# Patient Record
Sex: FEMALE | Race: WHITE | NOT HISPANIC OR LATINO
[De-identification: names, ages, dates, MRNs, and addresses within clinical notes are randomized per-mention and may not be internally consistent; named-entity substitution may affect disease eponyms.]

---

## 2017-11-12 ENCOUNTER — TRANSCRIPTION ENCOUNTER (OUTPATIENT)
Age: 62
End: 2017-11-12

## 2018-11-07 ENCOUNTER — RESULT REVIEW (OUTPATIENT)
Age: 63
End: 2018-11-07

## 2019-08-02 ENCOUNTER — TRANSCRIPTION ENCOUNTER (OUTPATIENT)
Age: 64
End: 2019-08-02

## 2019-08-02 ENCOUNTER — APPOINTMENT (OUTPATIENT)
Dept: ORTHOPEDIC SURGERY | Facility: CLINIC | Age: 64
End: 2019-08-02
Payer: MEDICARE

## 2019-08-02 VITALS
HEIGHT: 62 IN | SYSTOLIC BLOOD PRESSURE: 113 MMHG | TEMPERATURE: 98.5 F | WEIGHT: 130 LBS | DIASTOLIC BLOOD PRESSURE: 74 MMHG | BODY MASS INDEX: 23.92 KG/M2 | HEART RATE: 74 BPM

## 2019-08-02 DIAGNOSIS — M54.5 LOW BACK PAIN: ICD-10-CM

## 2019-08-02 DIAGNOSIS — M47.816 SPONDYLOSIS W/OUT MYELOPATHY OR RADICULOPATHY, LUMBAR REGION: ICD-10-CM

## 2019-08-02 DIAGNOSIS — Z87.39 PERSONAL HISTORY OF OTHER DISEASES OF THE MUSCULOSKELETAL SYSTEM AND CONNECTIVE TISSUE: ICD-10-CM

## 2019-08-02 DIAGNOSIS — M60.9 MYOSITIS, UNSPECIFIED: ICD-10-CM

## 2019-08-02 DIAGNOSIS — Z80.9 FAMILY HISTORY OF MALIGNANT NEOPLASM, UNSPECIFIED: ICD-10-CM

## 2019-08-02 DIAGNOSIS — Z86.39 PERSONAL HISTORY OF OTHER ENDOCRINE, NUTRITIONAL AND METABOLIC DISEASE: ICD-10-CM

## 2019-08-02 PROCEDURE — 99204 OFFICE O/P NEW MOD 45 MIN: CPT

## 2019-08-02 PROCEDURE — 72100 X-RAY EXAM L-S SPINE 2/3 VWS: CPT

## 2019-08-02 RX ORDER — LEVOTHYROXINE SODIUM 137 UG/1
TABLET ORAL
Refills: 0 | Status: ACTIVE | COMMUNITY

## 2019-08-02 RX ORDER — VENLAFAXINE HCL 50 MG
TABLET ORAL
Refills: 0 | Status: ACTIVE | COMMUNITY
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
## 2019-08-02 NOTE — ADDENDUM
[FreeTextEntry1] : Documented by Tyler Morrell acting as a scribe for Dr. Francis Torres on 08/02/2019.\par \par All medical record entries made by the Scribe were at my, Dr. Francis Torres, direction and personally dictated by me on 08/02/2019. I have reviewed the chart and agree that the record accurately reflects my personal performance of the history, physical exam, assessment and plan. I have also personally directed, reviewed, and agreed with the chart.

## 2019-08-02 NOTE — DISCUSSION/SUMMARY
[de-identified] : I have recommended that the pt continue with a conservative treatment plan. Home exercises were recommended, focusing on core strengthening. The pt was advised to continue with use of anti-inflammatories prn. The pt has been referred to Physiatry for possible injection therapy. She may FU PRN. If her symptoms continue after 6-8 weeks, we will consider referral to pain management.

## 2019-08-02 NOTE — PHYSICAL EXAM
[de-identified] : CONSTITUTIONAL: The patient is a very pleasant individual who is well-nourished and who appears stated age.\par PSYCHIATRIC: The patient is alert and oriented X 3 and in no apparent distress, and participates with orthopedic evaluation well.\par HEAD: Atraumatic and is nonsyndromic in appearance.\par EENT: No visible thyromegaly, EOMI.\par RESPIRATORY: Respiratory rate is regular, not dyspneic on examination.\par LYMPHATICS: There is no inguinal lymphadenopathy\par INTEGUMENTARY: Skin is clean, dry, and intact about the bilateral lower extremities and lumbar spine.\par VASCULAR: There is brisk capillary refill about the bilateral lower extremities.\par NEUROLOGIC: There are no pathologic reflexes. There is no decrease in sensation of the bilateral lower extremities on Wartenberg pinwheel examination. Deep tendon reflexes are well maintained at 2+/4 of the bilateral lower extremities and are symmetric. \par MUSCULOSKELETAL: There is no visible muscular atrophy. Manual motor strength is well maintained in the bilateral lower extremities. Range of motion of lumbar spine is well maintained. The patient ambulates in a non-myelopathic manner. Negative tension sign and straight leg raise bilaterally. Quad extension, ankle dorsiflexion, EHL, plantar flexion, and ankle eversion are well preserved. Normal secondary orthopaedic exam of bilateral hips, greater trochanteric area, knees and ankles.  [de-identified] : Xray of the Lumbar spine taken today: AP projection shows pedicles are well visualized L1-L5, no rotoscoliosis, lateral projections show well maintained lumbar lordosis, lumbar spondylosis L3 through S1, appears age appropriate, with no acute processes noted.

## 2019-11-19 ENCOUNTER — RESULT REVIEW (OUTPATIENT)
Age: 64
End: 2019-11-19

## 2020-11-20 ENCOUNTER — RESULT REVIEW (OUTPATIENT)
Age: 65
End: 2020-11-20

## 2021-02-02 ENCOUNTER — APPOINTMENT (OUTPATIENT)
Dept: OTOLARYNGOLOGY | Facility: CLINIC | Age: 66
End: 2021-02-02
Payer: MEDICARE

## 2021-02-09 ENCOUNTER — APPOINTMENT (OUTPATIENT)
Dept: OTOLARYNGOLOGY | Facility: CLINIC | Age: 66
End: 2021-02-09
Payer: MEDICARE

## 2021-02-09 VITALS — TEMPERATURE: 96 F | SYSTOLIC BLOOD PRESSURE: 119 MMHG | HEART RATE: 90 BPM | DIASTOLIC BLOOD PRESSURE: 67 MMHG

## 2021-02-09 DIAGNOSIS — L43.9 LICHEN PLANUS, UNSPECIFIED: ICD-10-CM

## 2021-02-09 DIAGNOSIS — Z87.891 PERSONAL HISTORY OF NICOTINE DEPENDENCE: ICD-10-CM

## 2021-02-09 PROCEDURE — 99213 OFFICE O/P EST LOW 20 MIN: CPT

## 2021-02-09 RX ORDER — PRAVASTATIN SODIUM 80 MG/1
TABLET ORAL
Refills: 0 | Status: DISCONTINUED | COMMUNITY
End: 2021-02-09

## 2021-02-09 RX ORDER — EZETIMIBE 10 MG/1
TABLET ORAL
Refills: 0 | Status: DISCONTINUED | COMMUNITY
End: 2021-02-09

## 2021-02-09 RX ORDER — ALIROCUMAB 150 MG/ML
INJECTION, SOLUTION SUBCUTANEOUS
Refills: 0 | Status: ACTIVE | COMMUNITY

## 2021-02-09 NOTE — CONSULT LETTER
[Dear  ___] : Dear  [unfilled], [Consult Letter:] : I had the pleasure of evaluating your patient, [unfilled]. [Please see my note below.] : Please see my note below. [Sincerely,] : Sincerely, [FreeTextEntry2] : Michael Winters MD ( [place], [place]) [FreeTextEntry3] : Ash Cintron MD, FACS\par \par    [facility]\par Associate Chair\par    Department of Otolaryngology\par \par Professor\par Otolaryngology & Molecular Medicine\par [employer]\par

## 2021-02-09 NOTE — HISTORY OF PRESENT ILLNESS
[de-identified] : Mrs. Marquez is a 64 yo female referred by Dr. Winters (otolaryngologist) for evaluation on palpable left neck mass near the mandible. Pt states lump comes and goes for last 3 months. Pt states pressure when swollen, denies pain, no other symptoms. \par CT scan on 11/2020 and MRI  of STN on 1/22/2021 reported no noted mass. \par \par

## 2021-02-09 NOTE — PHYSICAL EXAM
[Midline] : trachea located in midline position [de-identified] : bilat post buccal lichen planus [Normal] : no rashes

## 2021-02-17 ENCOUNTER — RESULT REVIEW (OUTPATIENT)
Age: 66
End: 2021-02-17

## 2021-02-17 ENCOUNTER — APPOINTMENT (OUTPATIENT)
Dept: ULTRASOUND IMAGING | Facility: CLINIC | Age: 66
End: 2021-02-17
Payer: MEDICARE

## 2021-02-17 ENCOUNTER — OUTPATIENT (OUTPATIENT)
Dept: OUTPATIENT SERVICES | Facility: HOSPITAL | Age: 66
LOS: 1 days | End: 2021-02-17
Payer: MEDICARE

## 2021-02-17 DIAGNOSIS — K11.20 SIALOADENITIS, UNSPECIFIED: ICD-10-CM

## 2021-02-17 PROCEDURE — 10005 FNA BX W/US GDN 1ST LES: CPT

## 2021-02-17 PROCEDURE — 88173 CYTOPATH EVAL FNA REPORT: CPT

## 2021-02-17 PROCEDURE — 88305 TISSUE EXAM BY PATHOLOGIST: CPT

## 2021-02-17 PROCEDURE — 88173 CYTOPATH EVAL FNA REPORT: CPT | Mod: 26

## 2021-02-17 PROCEDURE — 88305 TISSUE EXAM BY PATHOLOGIST: CPT | Mod: 26

## 2021-02-24 ENCOUNTER — NON-APPOINTMENT (OUTPATIENT)
Age: 66
End: 2021-02-24

## 2021-06-24 ENCOUNTER — TRANSCRIPTION ENCOUNTER (OUTPATIENT)
Age: 66
End: 2021-06-24

## 2021-07-01 ENCOUNTER — TRANSCRIPTION ENCOUNTER (OUTPATIENT)
Age: 66
End: 2021-07-01

## 2021-08-10 ENCOUNTER — APPOINTMENT (OUTPATIENT)
Dept: OTOLARYNGOLOGY | Facility: CLINIC | Age: 66
End: 2021-08-10
Payer: MEDICARE

## 2021-08-10 VITALS
BODY MASS INDEX: 25.11 KG/M2 | WEIGHT: 133 LBS | SYSTOLIC BLOOD PRESSURE: 123 MMHG | OXYGEN SATURATION: 99 % | DIASTOLIC BLOOD PRESSURE: 85 MMHG | HEIGHT: 61 IN | HEART RATE: 81 BPM

## 2021-08-10 PROCEDURE — 99213 OFFICE O/P EST LOW 20 MIN: CPT

## 2021-08-10 NOTE — HISTORY OF PRESENT ILLNESS
[de-identified] : 66 year old female follow up Feb 2021 FNA left parotid tail, non neoplastic. Pt massages area with fluid draining into mouth. Pt states pressure in area, denies pain.

## 2021-08-10 NOTE — PHYSICAL EXAM
[FreeTextEntry1] : sl fullness L TOP region without change [Midline] : trachea located in midline position [Normal] : no rashes

## 2022-02-28 ENCOUNTER — NON-APPOINTMENT (OUTPATIENT)
Age: 67
End: 2022-02-28

## 2022-03-08 ENCOUNTER — APPOINTMENT (OUTPATIENT)
Dept: ORTHOPEDIC SURGERY | Facility: CLINIC | Age: 67
End: 2022-03-08
Payer: MEDICARE

## 2022-03-08 ENCOUNTER — APPOINTMENT (OUTPATIENT)
Dept: OTOLARYNGOLOGY | Facility: CLINIC | Age: 67
End: 2022-03-08

## 2022-03-08 VITALS — HEIGHT: 61 IN | WEIGHT: 133 LBS | BODY MASS INDEX: 25.11 KG/M2

## 2022-03-08 DIAGNOSIS — M43.16 SPONDYLOLISTHESIS, LUMBAR REGION: ICD-10-CM

## 2022-03-08 DIAGNOSIS — M51.36 OTHER INTERVERTEBRAL DISC DEGENERATION, LUMBAR REGION: ICD-10-CM

## 2022-03-08 PROCEDURE — 72100 X-RAY EXAM L-S SPINE 2/3 VWS: CPT

## 2022-03-08 PROCEDURE — 99214 OFFICE O/P EST MOD 30 MIN: CPT

## 2022-03-08 PROCEDURE — 99204 OFFICE O/P NEW MOD 45 MIN: CPT

## 2022-04-26 ENCOUNTER — TRANSCRIPTION ENCOUNTER (OUTPATIENT)
Age: 67
End: 2022-04-26

## 2022-07-18 ENCOUNTER — NON-APPOINTMENT (OUTPATIENT)
Age: 67
End: 2022-07-18

## 2022-07-27 ENCOUNTER — APPOINTMENT (OUTPATIENT)
Dept: NEUROSURGERY | Facility: CLINIC | Age: 67
End: 2022-07-27

## 2022-07-27 VITALS
DIASTOLIC BLOOD PRESSURE: 60 MMHG | HEIGHT: 61 IN | OXYGEN SATURATION: 95 % | BODY MASS INDEX: 24.92 KG/M2 | SYSTOLIC BLOOD PRESSURE: 106 MMHG | TEMPERATURE: 98.7 F | WEIGHT: 132 LBS | HEART RATE: 76 BPM

## 2022-07-27 DIAGNOSIS — I67.1 CEREBRAL ANEURYSM, NONRUPTURED: ICD-10-CM

## 2022-07-27 PROCEDURE — 99205 OFFICE O/P NEW HI 60 MIN: CPT

## 2022-08-01 NOTE — ASSESSMENT
[FreeTextEntry1] : 2022\par \par Kim Fitzpatrick MD\par [address], [address]\par [place] [id number]\par \par Re:	Tisha Marquez\par :	1955\par Dear Dr. Fitzpatrick:\par \par I saw Mrs. Tisha Marquez in neurosurgical consultation today. She is a 67 year old, right handed female who three weeks ago had a questionable TIA. She was at a  and was confused, and somewhat disoriented, then this cleared. She was seen by yourself. MRI of the head was unremarkable. MRA of the neck was unremarkable. MRA of the head shows a 3 mm, distal cavernous ICA aneurysm. I reviewed the images and there is indeed a small aneurysm there, and it appears to be proximal to the subarachnoid compartment. \par \par The patient’s past medical history is negative. She has high cholesterol and low thyroid. She has had a variety of surgeries, including hysterectomy, benign breast cyst, , neck fusion, foot and wrist surgery. She has no allergies. She does not smoke. Family history is negative. She is on Crestor and Synthroid. She has a normal neurologic exam.\par \par IMPRESSION: This small lesion is an incidental finding, and should be managed conservatively. I would suggest follow-up in six to twelve months with an MRA at the same facility.\par \par I tried to put the patient, and her daughter who accompanied her, at ease. I see no indication for any intervention or angiography, and I will see her in follow-up.\par \par I thank you for the confidence and courtesy of this referral.\par \par Sincerely,\par \par \par \par Sami Cook M.D., F.A.C.S.\par [facility]\par \par \par \par
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
## 2022-08-09 ENCOUNTER — APPOINTMENT (OUTPATIENT)
Dept: OTOLARYNGOLOGY | Facility: CLINIC | Age: 67
End: 2022-08-09

## 2022-08-09 VITALS
DIASTOLIC BLOOD PRESSURE: 71 MMHG | BODY MASS INDEX: 24.92 KG/M2 | HEART RATE: 86 BPM | OXYGEN SATURATION: 98 % | HEIGHT: 61 IN | SYSTOLIC BLOOD PRESSURE: 103 MMHG | WEIGHT: 132 LBS

## 2022-08-09 DIAGNOSIS — K11.20 SIALOADENITIS, UNSPECIFIED: ICD-10-CM

## 2022-08-09 PROCEDURE — 99213 OFFICE O/P EST LOW 20 MIN: CPT

## 2022-08-09 NOTE — HISTORY OF PRESENT ILLNESS
[None] : No associated symptoms are reported. [de-identified] : Ms. Marquez presents for follow up on left neck lump. She reports lump comes intermittently. When she notices it, she rubs it and then she can taste "slimy". Denies pain, dysphagia, dysphonia and or dyspnea. No otalgia or trismus\par 6/30/2022 Normal MRA of the neck\par 2/21/2022 FNA left parotid gland non neoplastic\par 11/17/2020 Ct  scan not showing any masses\par Does not smoke. 1 glass of wine daily

## 2023-06-09 ENCOUNTER — APPOINTMENT (OUTPATIENT)
Dept: ORTHOPEDIC SURGERY | Facility: CLINIC | Age: 68
End: 2023-06-09
Payer: MEDICARE

## 2023-06-09 VITALS — HEIGHT: 62 IN | WEIGHT: 137 LBS | BODY MASS INDEX: 25.21 KG/M2

## 2023-06-09 DIAGNOSIS — M65.312 TRIGGER THUMB, LEFT THUMB: ICD-10-CM

## 2023-06-09 PROCEDURE — 20550 NJX 1 TENDON SHEATH/LIGAMENT: CPT | Mod: LT

## 2023-06-09 PROCEDURE — 99203 OFFICE O/P NEW LOW 30 MIN: CPT | Mod: 25

## 2023-06-09 NOTE — DISCUSSION/SUMMARY
[Medication Risks Reviewed] : Medication risks reviewed [de-identified] : Discussed the nature of the diagnosis and risk and benefits of different modalities of treatment.\par Discussed conservative management vs CSI. \par Pt would like a CSI.\par Done today and tolerated well.\par All questions answered.\par

## 2023-06-09 NOTE — PHYSICAL EXAM
[Left] : left hand [] : no erythema [FreeTextEntry3] : LEFT thumb A1 pulley tenderness with triggering

## 2023-06-09 NOTE — HISTORY OF PRESENT ILLNESS
[5] : 5 [Sharp] : sharp [Retired] : Work status: retired [de-identified] : 67 with one week LEFT thumb pain and stiffness.  Pain and stiffness is worse at night.  There is triggering and pain.  Denies trauma.  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 No known injury/fall. Reports clicking during the day and sharp pain at night for about one week

## 2024-05-22 ENCOUNTER — RESULT REVIEW (OUTPATIENT)
Age: 69
End: 2024-05-22

## 2024-05-22 ENCOUNTER — INPATIENT (INPATIENT)
Facility: HOSPITAL | Age: 69
LOS: 0 days | Discharge: HOME | DRG: 93 | End: 2024-05-23
Attending: INTERNAL MEDICINE | Admitting: INTERNAL MEDICINE
Payer: MEDICARE

## 2024-05-22 VITALS
HEART RATE: 94 BPM | OXYGEN SATURATION: 99 % | DIASTOLIC BLOOD PRESSURE: 71 MMHG | SYSTOLIC BLOOD PRESSURE: 119 MMHG | TEMPERATURE: 98 F | RESPIRATION RATE: 18 BRPM | WEIGHT: 126.99 LBS

## 2024-05-22 DIAGNOSIS — R29.810 FACIAL WEAKNESS: ICD-10-CM

## 2024-05-22 DIAGNOSIS — G45.9 TRANSIENT CEREBRAL ISCHEMIC ATTACK, UNSPECIFIED: ICD-10-CM

## 2024-05-22 DIAGNOSIS — E78.5 HYPERLIPIDEMIA, UNSPECIFIED: ICD-10-CM

## 2024-05-22 DIAGNOSIS — Z86.69 PERSONAL HISTORY OF OTHER DISEASES OF THE NERVOUS SYSTEM AND SENSE ORGANS: ICD-10-CM

## 2024-05-22 DIAGNOSIS — E03.9 HYPOTHYROIDISM, UNSPECIFIED: ICD-10-CM

## 2024-05-22 DIAGNOSIS — G47.33 OBSTRUCTIVE SLEEP APNEA (ADULT) (PEDIATRIC): ICD-10-CM

## 2024-05-22 DIAGNOSIS — F41.9 ANXIETY DISORDER, UNSPECIFIED: ICD-10-CM

## 2024-05-22 DIAGNOSIS — Z29.9 ENCOUNTER FOR PROPHYLACTIC MEASURES, UNSPECIFIED: ICD-10-CM

## 2024-05-22 LAB
ALBUMIN SERPL ELPH-MCNC: 3.9 G/DL — SIGNIFICANT CHANGE UP (ref 3.3–5)
ALP SERPL-CCNC: 81 U/L — SIGNIFICANT CHANGE UP (ref 40–120)
ALT FLD-CCNC: 32 U/L — SIGNIFICANT CHANGE UP (ref 12–78)
ANION GAP SERPL CALC-SCNC: 2 MMOL/L — LOW (ref 5–17)
APTT BLD: 31.7 SEC — SIGNIFICANT CHANGE UP (ref 24.5–35.6)
AST SERPL-CCNC: 24 U/L — SIGNIFICANT CHANGE UP (ref 15–37)
BASOPHILS # BLD AUTO: 0.04 K/UL — SIGNIFICANT CHANGE UP (ref 0–0.2)
BASOPHILS NFR BLD AUTO: 0.5 % — SIGNIFICANT CHANGE UP (ref 0–2)
BILIRUB SERPL-MCNC: 0.3 MG/DL — SIGNIFICANT CHANGE UP (ref 0.2–1.2)
BUN SERPL-MCNC: 19 MG/DL — SIGNIFICANT CHANGE UP (ref 7–23)
CALCIUM SERPL-MCNC: 9.8 MG/DL — SIGNIFICANT CHANGE UP (ref 8.5–10.1)
CHLORIDE SERPL-SCNC: 107 MMOL/L — SIGNIFICANT CHANGE UP (ref 96–108)
CO2 SERPL-SCNC: 30 MMOL/L — SIGNIFICANT CHANGE UP (ref 22–31)
CREAT SERPL-MCNC: 0.65 MG/DL — SIGNIFICANT CHANGE UP (ref 0.5–1.3)
EGFR: 96 ML/MIN/1.73M2 — SIGNIFICANT CHANGE UP
EOSINOPHIL # BLD AUTO: 0.12 K/UL — SIGNIFICANT CHANGE UP (ref 0–0.5)
EOSINOPHIL NFR BLD AUTO: 1.6 % — SIGNIFICANT CHANGE UP (ref 0–6)
GLUCOSE SERPL-MCNC: 99 MG/DL — SIGNIFICANT CHANGE UP (ref 70–99)
HCT VFR BLD CALC: 40.4 % — SIGNIFICANT CHANGE UP (ref 34.5–45)
HGB BLD-MCNC: 13.5 G/DL — SIGNIFICANT CHANGE UP (ref 11.5–15.5)
IMM GRANULOCYTES NFR BLD AUTO: 0.3 % — SIGNIFICANT CHANGE UP (ref 0–0.9)
INR BLD: 0.92 RATIO — SIGNIFICANT CHANGE UP (ref 0.85–1.18)
LYMPHOCYTES # BLD AUTO: 2.35 K/UL — SIGNIFICANT CHANGE UP (ref 1–3.3)
LYMPHOCYTES # BLD AUTO: 31.4 % — SIGNIFICANT CHANGE UP (ref 13–44)
MCHC RBC-ENTMCNC: 32.2 PG — SIGNIFICANT CHANGE UP (ref 27–34)
MCHC RBC-ENTMCNC: 33.4 GM/DL — SIGNIFICANT CHANGE UP (ref 32–36)
MCV RBC AUTO: 96.4 FL — SIGNIFICANT CHANGE UP (ref 80–100)
MONOCYTES # BLD AUTO: 0.78 K/UL — SIGNIFICANT CHANGE UP (ref 0–0.9)
MONOCYTES NFR BLD AUTO: 10.4 % — SIGNIFICANT CHANGE UP (ref 2–14)
NEUTROPHILS # BLD AUTO: 4.18 K/UL — SIGNIFICANT CHANGE UP (ref 1.8–7.4)
NEUTROPHILS NFR BLD AUTO: 55.8 % — SIGNIFICANT CHANGE UP (ref 43–77)
NRBC # BLD: 0 /100 WBCS — SIGNIFICANT CHANGE UP (ref 0–0)
PLATELET # BLD AUTO: 377 K/UL — SIGNIFICANT CHANGE UP (ref 150–400)
POTASSIUM SERPL-MCNC: 4.2 MMOL/L — SIGNIFICANT CHANGE UP (ref 3.5–5.3)
POTASSIUM SERPL-SCNC: 4.2 MMOL/L — SIGNIFICANT CHANGE UP (ref 3.5–5.3)
PROT SERPL-MCNC: 7.8 G/DL — SIGNIFICANT CHANGE UP (ref 6–8.3)
PROTHROM AB SERPL-ACNC: 10.8 SEC — SIGNIFICANT CHANGE UP (ref 9.5–13)
RBC # BLD: 4.19 M/UL — SIGNIFICANT CHANGE UP (ref 3.8–5.2)
RBC # FLD: 12.6 % — SIGNIFICANT CHANGE UP (ref 10.3–14.5)
SODIUM SERPL-SCNC: 139 MMOL/L — SIGNIFICANT CHANGE UP (ref 135–145)
TROPONIN I, HIGH SENSITIVITY RESULT: <3 NG/L — SIGNIFICANT CHANGE UP
WBC # BLD: 7.49 K/UL — SIGNIFICANT CHANGE UP (ref 3.8–10.5)
WBC # FLD AUTO: 7.49 K/UL — SIGNIFICANT CHANGE UP (ref 3.8–10.5)

## 2024-05-22 PROCEDURE — 99222 1ST HOSP IP/OBS MODERATE 55: CPT | Mod: GC

## 2024-05-22 PROCEDURE — 70450 CT HEAD/BRAIN W/O DYE: CPT | Mod: 26,MC,XU

## 2024-05-22 PROCEDURE — 99285 EMERGENCY DEPT VISIT HI MDM: CPT

## 2024-05-22 PROCEDURE — 70496 CT ANGIOGRAPHY HEAD: CPT | Mod: 26,MC

## 2024-05-22 PROCEDURE — 70498 CT ANGIOGRAPHY NECK: CPT | Mod: 26,MC

## 2024-05-22 PROCEDURE — 0042T: CPT | Mod: MC

## 2024-05-22 RX ORDER — ESCITALOPRAM OXALATE 10 MG/1
10 TABLET, FILM COATED ORAL DAILY
Refills: 0 | Status: DISCONTINUED | OUTPATIENT
Start: 2024-05-22 | End: 2024-05-23

## 2024-05-22 RX ORDER — MELOXICAM 15 MG/1
1 TABLET ORAL
Refills: 0 | DISCHARGE

## 2024-05-22 RX ORDER — ENOXAPARIN SODIUM 100 MG/ML
40 INJECTION SUBCUTANEOUS EVERY 24 HOURS
Refills: 0 | Status: DISCONTINUED | OUTPATIENT
Start: 2024-05-22 | End: 2024-05-23

## 2024-05-22 RX ORDER — ACETAMINOPHEN 500 MG
650 TABLET ORAL EVERY 6 HOURS
Refills: 0 | Status: DISCONTINUED | OUTPATIENT
Start: 2024-05-22 | End: 2024-05-23

## 2024-05-22 RX ORDER — LEVOTHYROXINE SODIUM 125 MCG
75 TABLET ORAL DAILY
Refills: 0 | Status: DISCONTINUED | OUTPATIENT
Start: 2024-05-22 | End: 2024-05-23

## 2024-05-22 RX ORDER — CLOPIDOGREL BISULFATE 75 MG/1
75 TABLET, FILM COATED ORAL DAILY
Refills: 0 | Status: DISCONTINUED | OUTPATIENT
Start: 2024-05-23 | End: 2024-05-23

## 2024-05-22 RX ORDER — ATORVASTATIN CALCIUM 80 MG/1
80 TABLET, FILM COATED ORAL AT BEDTIME
Refills: 0 | Status: DISCONTINUED | OUTPATIENT
Start: 2024-05-22 | End: 2024-05-23

## 2024-05-22 RX ORDER — CLOPIDOGREL BISULFATE 75 MG/1
75 TABLET, FILM COATED ORAL ONCE
Refills: 0 | Status: COMPLETED | OUTPATIENT
Start: 2024-05-22 | End: 2024-05-22

## 2024-05-22 RX ORDER — SOLIFENACIN SUCCINATE 10 MG/1
1 TABLET ORAL
Refills: 0 | DISCHARGE

## 2024-05-22 RX ORDER — ESCITALOPRAM OXALATE 10 MG/1
1 TABLET, FILM COATED ORAL
Refills: 0 | DISCHARGE

## 2024-05-22 RX ADMIN — CLOPIDOGREL BISULFATE 75 MILLIGRAM(S): 75 TABLET, FILM COATED ORAL at 14:10

## 2024-05-22 RX ADMIN — ATORVASTATIN CALCIUM 80 MILLIGRAM(S): 80 TABLET, FILM COATED ORAL at 22:31

## 2024-05-22 RX ADMIN — ENOXAPARIN SODIUM 40 MILLIGRAM(S): 100 INJECTION SUBCUTANEOUS at 14:10

## 2024-05-22 NOTE — ED PROVIDER NOTE - PHYSICAL EXAMINATION
Left lower facial droop, decreased motion with smile.  Normal upper face.  No other acute neuro changes.  Nonfocal detailed neurologic exam otherwise noted.

## 2024-05-22 NOTE — ED PROVIDER NOTE - PROGRESS NOTE DETAILS
Patient doing well, no acute complaints this time.  Discussed with patient regarding all findings, will reevaluate.  Will discuss with neurology. Patient seen by Dr. Beltrán, should give Plavix 75 mg.  The patient had a abnormal bleeding issue after aspirin, while not technically an allergy, we will hold the aspirin because of this, risk, and will instead give Plavix.  Discussed with Dr. Davison, will see patient to admit. (C Sati)

## 2024-05-22 NOTE — H&P ADULT - PROBLEM SELECTOR PLAN 1
-Admit to telemetry for r/o CVA vs Bell's palsy  -CTA head and neck with no evidence of acute bleed or ischemic infarct  -Goal -180, keep diastolic <105  -Will need TTE and MRI head non con  -Aspiration, seizure, fall precaution  -neuro checks Q4  -Passed bedside dysphagia screen, start regular diet  -PT and OT consultation  -Check A1c, lipid profile, TSH for further risk stratification  - Continue high dose statin  - Will defer aspirin given hx of acute bleed, neuro aware  -Neuro Dr. Beltrán consulted -Admit to telemetry for r/o CVA vs Bell's palsy  -CTA head and neck with no evidence of acute bleed or ischemic infarct  -Goal -180, keep diastolic <105  -Will need TTE and MRI head non con  -Aspiration, seizure, fall precaution  -neuro checks Q4  -Passed bedside dysphagia screen, start regular diet  -PT and OT consultation : no needs  -Check A1c, lipid profile, TSH for further risk stratification  - Continue high dose statin  - Will defer aspirin given hx of acute bleed, neuro aware  -Neuro Dr. Beltrán consulted -Admit to telemetry for r/o CVA vs Bell's palsy  -CTA head and neck with no evidence of acute bleed or ischemic infarct  -Goal -180, keep diastolic <105  -Will need TTE and MRI head non con  -Aspiration, seizure, fall precaution  -neuro checks Q4  -Passed bedside dysphagia screen, start regular diet  -PT and OT consultation : no needs  -Check A1c, lipid profile, TSH for further risk stratification  - Continue high dose statin  - Continue Plavix 75mg qd  - Will defer aspirin given hx of acute bleed, neuro aware  -Neuro Dr. Beltrán consulted

## 2024-05-22 NOTE — ED PROVIDER NOTE - OBJECTIVE STATEMENT
68-year-old female with a history of high cholesterol, multiple TIAs in the past, hypothyroidism, Bell's palsy presents with having some left-sided facial droop since 8 PM last night.  Patient denies any numbness or tingling in the face.  No numbness or tingling the arms or legs.  No focal weakness.  No change in her speech.  No acute headache.  No recent fall or trauma.  The patient had Bell's palsy on the right side in the past.  The TIAs that she had were also related to speech.  No abdominal pain.  No vomiting or diarrhea.  No neck pain or stiffness.  No photophobia.  No fever or chills.  No recent illness.  No aggravating or alleviating factors otherwise noted.  No other acute injury or complaints.

## 2024-05-22 NOTE — ED PROVIDER NOTE - NSICDXPASTMEDICALHX_GEN_ALL_CORE_FT
PAST MEDICAL HISTORY:  Dyslipidemia     Hallux rigidus of left foot     Hyperlipidemia     Hypothyroidism     Insomnia     Obstructive sleep apnea     TIA (transient ischemic attack)

## 2024-05-22 NOTE — PHYSICAL THERAPY INITIAL EVALUATION ADULT - ADDITIONAL COMMENTS
Patient reports that she lives in a condo with bed/bath on main level, independent with ADLs/ambulation PTA.

## 2024-05-22 NOTE — ED PROVIDER NOTE - CLINICAL SUMMARY MEDICAL DECISION MAKING FREE TEXT BOX
Left-sided facial droop since 8 PM last night.  The upper face is spared.  Otherwise neurologically intact.  Will check labs, CT/CTA, IV, reeval

## 2024-05-22 NOTE — ED ADULT TRIAGE NOTE - CHIEF COMPLAINT QUOTE
C/o L sided facial droop since last night.  Noted no weakness to L arm/leg.  Noted ability to move eyebrows and no loss of glossal control.  Hx bells palsy in past.  Relates increased stress in life and is tearful in triage.

## 2024-05-22 NOTE — H&P ADULT - ATTENDING COMMENTS
67yo F with PMHx TIA, HLD, HARRIS (not on CPAP), hypothyroidism, cerebral aneurysm (3mm) and right sided Bell's palsy presenting with left sided facial droop. Admitted for r/o CVA    Mauri Davison, Attending Physician 67yo F with PMHx TIA, HLD, HARRIS (not on CPAP), hypothyroidism, cerebral aneurysm (3mm) and right sided Bell's palsy presenting with left sided facial droop. Admitted for r/o CVA    Neuro consulted, care d/w Dr. Beltrán, plan for MRI head, tte, tele monitoring, statin, asa, permissive htn, PT/OT, neuro checks.    Mauri Davison, Attending Physician

## 2024-05-22 NOTE — H&P ADULT - ASSESSMENT
67yo F with PMHx TIA, HLD, HARRIS (not on CPAP), hypothyroidism, cerebral aneurysm (3mm) and Bell's palsy presenting with left sided facial droop. Admitted for r/o CVA 67yo F with PMHx TIA, HLD, HARRIS (not on CPAP), hypothyroidism, cerebral aneurysm (3mm) and right sided Bell's palsy presenting with left sided facial droop. Admitted for r/o CVA

## 2024-05-22 NOTE — H&P ADULT - NSHPPHYSICALEXAM_GEN_ALL_CORE
T(C): 36.9 (05-22-24 @ 12:14), Max: 36.9 (05-22-24 @ 12:14)  HR: 86 (05-22-24 @ 12:14) (86 - 94)  BP: 119/62 (05-22-24 @ 12:14) (118/68 - 119/71)  RR: 18 (05-22-24 @ 12:14) (18 - 18)  SpO2: 98% (05-22-24 @ 12:14) (98% - 99%)  Wt(kg): --    Physical Exam:   GENERAL: well-groomed, well-developed, NAD  HEENT: head NC/AT; EOMI, conjunctiva & sclera clear; hearing grossly intact, moist mucous membranes  RESPIRATORY: CTA B/L, no wheezing, rales, rhonchi or rubs  CARDIOVASCULAR: S1&S2, RRR, no murmurs or gallops  ABDOMEN: soft, non-tender, non-distended, no guarding, rebound or rigidity  MUSCULOSKELETAL:  no clubbing, cyanosis or edema of all 4 extremities  SKIN: warm and dry, color normal  NEUROLOGIC: AA&O X3, +left sided facial droop, no sensory loss, motor Strength 5/5 in UE & LE B/L  Psych: Normal mood and affect, normal behavior

## 2024-05-22 NOTE — OCCUPATIONAL THERAPY INITIAL EVALUATION ADULT - RANGE OF MOTION EXAMINATION, UPPER EXTREMITY
WFL BUE coordination observed during ADLs/bilateral UE Active ROM was WFL  (within functional limits)

## 2024-05-22 NOTE — PATIENT PROFILE ADULT - FALL HARM RISK - UNIVERSAL INTERVENTIONS
Bed in lowest position, wheels locked, appropriate side rails in place/Call bell, personal items and telephone in reach/Instruct patient to call for assistance before getting out of bed or chair/Non-slip footwear when patient is out of bed/[place] to call system/Physically safe environment - no spills, clutter or unnecessary equipment/Purposeful Proactive Rounding/Room/bathroom lighting operational, light cord in reach

## 2024-05-22 NOTE — H&P ADULT - NSHPSOCIALHISTORY_GEN_ALL_CORE
Tobacco Usage: former smoker, quit 1989, 10 pack yr hx  Alcohol Usage: 1 glass red wine daily  Substance Use: denies  Lives with:  at home  ADLs/Ambulation: independent

## 2024-05-22 NOTE — H&P ADULT - NSICDXPASTMEDICALHX_GEN_ALL_CORE_FT
PAST MEDICAL HISTORY:  Dyslipidemia     H/O Bell's palsy     Hallux rigidus of left foot     History of cerebral aneurysm     Hyperlipidemia     Hypothyroidism     Insomnia     Obstructive sleep apnea     TIA (transient ischemic attack)

## 2024-05-22 NOTE — PHYSICAL THERAPY INITIAL EVALUATION ADULT - PERTINENT HX OF CURRENT PROBLEM, REHAB EVAL
68-year-old female with a history of high cholesterol, multiple TIAs in the past, hypothyroidism, Bell's palsy presents with having some left-sided facial droop since 8 PM last night. CT (-) for acute CVA.

## 2024-05-22 NOTE — OCCUPATIONAL THERAPY INITIAL EVALUATION ADULT - ADDITIONAL COMMENTS
Pt lives in a 2 floor condo. Pt reports PTA she was independent with ADLs/IADLs and functional mobility without AD. (+) . Pt reports she was very active PTA.

## 2024-05-22 NOTE — H&P ADULT - HISTORY OF PRESENT ILLNESS
67yo F with PMHx TIA, HLD, HARRIS (not on CPAP), hypothyroidism, cerebral aneurysm (3mm) and Bell's palsy presenting with left sided facial droop. Patient states she noted left sided facial droop since 8pm last night. Her prior TIAs she exhibited symptoms of dysarthria, mental fog and nausea, but has none of these symptoms currently. She states her hx of Bell's palsy is right sided. Aside from the facial droop she denies any other symptoms    IN THE ED  VS: T 98.5, HR 86, /62, RR 18, SpO2 98% on RA  Labs: WNL  Imaging: CTA head and neck with no acute pathology  EKG NSR  S/p Plavix 75mg, Lovenox 40mg

## 2024-05-22 NOTE — ED ADULT NURSE NOTE - NSFALLUNIVINTERV_ED_ALL_ED
Bed/Stretcher in lowest position, wheels locked, appropriate side rails in place/Call bell, personal items and telephone in reach/Instruct patient to call for assistance before getting out of bed/chair/stretcher/Non-slip footwear applied when patient is off stretcher/[place] to call system/Physically safe environment - no spills, clutter or unnecessary equipment/Purposeful proactive rounding/Room/bathroom lighting operational, light cord in reach

## 2024-05-22 NOTE — OCCUPATIONAL THERAPY INITIAL EVALUATION ADULT - PERTINENT HX OF CURRENT PROBLEM, REHAB EVAL
67yo F with PMHx TIA, HLD, HARRIS (not on CPAP), hypothyroidism, cerebral aneurysm (3mm) and Bell's palsy presenting with left sided facial droop. Patient states she noted left sided facial droop since 8pm last night. Her prior TIAs she exhibited symptoms of dysarthria, mental fog and nausea, but has none of these symptoms currently. She states her hx of Bell's palsy is right sided. Aside from the facial droop she denies any other symptoms. CT brain negative; MRI pending.

## 2024-05-22 NOTE — ED PROVIDER NOTE - NSICDXPASTSURGICALHX_GEN_ALL_CORE_FT
PAST SURGICAL HISTORY:  Fracture of wrist right 2009    History of hysterectomy 2003    Hx of foot surgery 2011    S/P spinal fusion cervical  2005

## 2024-05-22 NOTE — H&P ADULT - NSHPREVIEWOFSYSTEMS_GEN_ALL_CORE
REVIEW OF SYSTEMS:    CONSTITUTIONAL: No weakness, fevers or chills  HEENT:  No headache, no visual changes, no sore throat, neck supple  RESPIRATORY: No cough, wheezing, hemoptysis; No shortness of breath  CARDIOVASCULAR: No chest pain or palpitations  GASTROINTESTINAL: No abdominal pain, no nausea, vomiting, or hematemesis; No diarrhea or constipation. No melena or hematochezia.  GENITOURINARY: No dysuria, frequency or hematuria  NEUROLOGICAL: No focal weakness or dizziness. +left sided facial droop  MSK: No myalgias  SKIN: No itching, burning, rashes, or lesions

## 2024-05-23 ENCOUNTER — TRANSCRIPTION ENCOUNTER (OUTPATIENT)
Age: 69
End: 2024-05-23

## 2024-05-23 VITALS
HEART RATE: 73 BPM | SYSTOLIC BLOOD PRESSURE: 113 MMHG | TEMPERATURE: 98 F | OXYGEN SATURATION: 98 % | RESPIRATION RATE: 17 BRPM | DIASTOLIC BLOOD PRESSURE: 74 MMHG

## 2024-05-23 LAB
A1C WITH ESTIMATED AVERAGE GLUCOSE RESULT: 5.9 % — HIGH (ref 4–5.6)
ALBUMIN SERPL ELPH-MCNC: 3.3 G/DL — SIGNIFICANT CHANGE UP (ref 3.3–5)
ALP SERPL-CCNC: 75 U/L — SIGNIFICANT CHANGE UP (ref 40–120)
ALT FLD-CCNC: 28 U/L — SIGNIFICANT CHANGE UP (ref 12–78)
ANION GAP SERPL CALC-SCNC: 3 MMOL/L — LOW (ref 5–17)
AST SERPL-CCNC: 21 U/L — SIGNIFICANT CHANGE UP (ref 15–37)
BASOPHILS # BLD AUTO: 0.03 K/UL — SIGNIFICANT CHANGE UP (ref 0–0.2)
BASOPHILS NFR BLD AUTO: 0.7 % — SIGNIFICANT CHANGE UP (ref 0–2)
BILIRUB SERPL-MCNC: 0.5 MG/DL — SIGNIFICANT CHANGE UP (ref 0.2–1.2)
BUN SERPL-MCNC: 14 MG/DL — SIGNIFICANT CHANGE UP (ref 7–23)
CALCIUM SERPL-MCNC: 9.2 MG/DL — SIGNIFICANT CHANGE UP (ref 8.5–10.1)
CHLORIDE SERPL-SCNC: 106 MMOL/L — SIGNIFICANT CHANGE UP (ref 96–108)
CHOLEST SERPL-MCNC: 192 MG/DL — SIGNIFICANT CHANGE UP
CO2 SERPL-SCNC: 29 MMOL/L — SIGNIFICANT CHANGE UP (ref 22–31)
CREAT SERPL-MCNC: 0.58 MG/DL — SIGNIFICANT CHANGE UP (ref 0.5–1.3)
EGFR: 99 ML/MIN/1.73M2 — SIGNIFICANT CHANGE UP
EOSINOPHIL # BLD AUTO: 0.12 K/UL — SIGNIFICANT CHANGE UP (ref 0–0.5)
EOSINOPHIL NFR BLD AUTO: 2.8 % — SIGNIFICANT CHANGE UP (ref 0–6)
ESTIMATED AVERAGE GLUCOSE: 123 MG/DL — HIGH (ref 68–114)
GLUCOSE SERPL-MCNC: 108 MG/DL — HIGH (ref 70–99)
HCT VFR BLD CALC: 39.3 % — SIGNIFICANT CHANGE UP (ref 34.5–45)
HDLC SERPL-MCNC: 62 MG/DL — SIGNIFICANT CHANGE UP
HGB BLD-MCNC: 13.4 G/DL — SIGNIFICANT CHANGE UP (ref 11.5–15.5)
IMM GRANULOCYTES NFR BLD AUTO: 0.2 % — SIGNIFICANT CHANGE UP (ref 0–0.9)
LIPID PNL WITH DIRECT LDL SERPL: 111 MG/DL — HIGH
LYMPHOCYTES # BLD AUTO: 1.52 K/UL — SIGNIFICANT CHANGE UP (ref 1–3.3)
LYMPHOCYTES # BLD AUTO: 35.5 % — SIGNIFICANT CHANGE UP (ref 13–44)
MCHC RBC-ENTMCNC: 32.3 PG — SIGNIFICANT CHANGE UP (ref 27–34)
MCHC RBC-ENTMCNC: 34.1 GM/DL — SIGNIFICANT CHANGE UP (ref 32–36)
MCV RBC AUTO: 94.7 FL — SIGNIFICANT CHANGE UP (ref 80–100)
MONOCYTES # BLD AUTO: 0.53 K/UL — SIGNIFICANT CHANGE UP (ref 0–0.9)
MONOCYTES NFR BLD AUTO: 12.4 % — SIGNIFICANT CHANGE UP (ref 2–14)
NEUTROPHILS # BLD AUTO: 2.07 K/UL — SIGNIFICANT CHANGE UP (ref 1.8–7.4)
NEUTROPHILS NFR BLD AUTO: 48.4 % — SIGNIFICANT CHANGE UP (ref 43–77)
NON HDL CHOLESTEROL: 129 MG/DL — SIGNIFICANT CHANGE UP
NRBC # BLD: 0 /100 WBCS — SIGNIFICANT CHANGE UP (ref 0–0)
PLATELET # BLD AUTO: 360 K/UL — SIGNIFICANT CHANGE UP (ref 150–400)
POTASSIUM SERPL-MCNC: 4 MMOL/L — SIGNIFICANT CHANGE UP (ref 3.5–5.3)
POTASSIUM SERPL-SCNC: 4 MMOL/L — SIGNIFICANT CHANGE UP (ref 3.5–5.3)
PROT SERPL-MCNC: 7.2 G/DL — SIGNIFICANT CHANGE UP (ref 6–8.3)
RBC # BLD: 4.15 M/UL — SIGNIFICANT CHANGE UP (ref 3.8–5.2)
RBC # FLD: 12.5 % — SIGNIFICANT CHANGE UP (ref 10.3–14.5)
SODIUM SERPL-SCNC: 138 MMOL/L — SIGNIFICANT CHANGE UP (ref 135–145)
T3FREE SERPL-MCNC: 2.6 PG/ML — SIGNIFICANT CHANGE UP (ref 2–4.4)
T4 FREE SERPL-MCNC: 1.4 NG/DL — SIGNIFICANT CHANGE UP (ref 0.9–1.8)
TRIGL SERPL-MCNC: 104 MG/DL — SIGNIFICANT CHANGE UP
TSH SERPL-MCNC: 0.92 UIU/ML — SIGNIFICANT CHANGE UP (ref 0.36–3.74)
WBC # BLD: 4.28 K/UL — SIGNIFICANT CHANGE UP (ref 3.8–10.5)
WBC # FLD AUTO: 4.28 K/UL — SIGNIFICANT CHANGE UP (ref 3.8–10.5)

## 2024-05-23 PROCEDURE — 70450 CT HEAD/BRAIN W/O DYE: CPT | Mod: MC

## 2024-05-23 PROCEDURE — 97165 OT EVAL LOW COMPLEX 30 MIN: CPT

## 2024-05-23 PROCEDURE — 83036 HEMOGLOBIN GLYCOSYLATED A1C: CPT

## 2024-05-23 PROCEDURE — 84484 ASSAY OF TROPONIN QUANT: CPT

## 2024-05-23 PROCEDURE — 84481 FREE ASSAY (FT-3): CPT

## 2024-05-23 PROCEDURE — 84443 ASSAY THYROID STIM HORMONE: CPT

## 2024-05-23 PROCEDURE — 70498 CT ANGIOGRAPHY NECK: CPT | Mod: MC

## 2024-05-23 PROCEDURE — 82962 GLUCOSE BLOOD TEST: CPT

## 2024-05-23 PROCEDURE — 93306 TTE W/DOPPLER COMPLETE: CPT | Mod: 26

## 2024-05-23 PROCEDURE — 93005 ELECTROCARDIOGRAM TRACING: CPT

## 2024-05-23 PROCEDURE — 85730 THROMBOPLASTIN TIME PARTIAL: CPT

## 2024-05-23 PROCEDURE — 0042T: CPT | Mod: MC

## 2024-05-23 PROCEDURE — 70496 CT ANGIOGRAPHY HEAD: CPT | Mod: MC

## 2024-05-23 PROCEDURE — 97162 PT EVAL MOD COMPLEX 30 MIN: CPT

## 2024-05-23 PROCEDURE — 84439 ASSAY OF FREE THYROXINE: CPT

## 2024-05-23 PROCEDURE — 85025 COMPLETE CBC W/AUTO DIFF WBC: CPT

## 2024-05-23 PROCEDURE — 36415 COLL VENOUS BLD VENIPUNCTURE: CPT

## 2024-05-23 PROCEDURE — 80061 LIPID PANEL: CPT

## 2024-05-23 PROCEDURE — 99239 HOSP IP/OBS DSCHRG MGMT >30: CPT

## 2024-05-23 PROCEDURE — 85610 PROTHROMBIN TIME: CPT

## 2024-05-23 PROCEDURE — 99285 EMERGENCY DEPT VISIT HI MDM: CPT

## 2024-05-23 PROCEDURE — 80053 COMPREHEN METABOLIC PANEL: CPT

## 2024-05-23 RX ORDER — ROSUVASTATIN CALCIUM 5 MG/1
1 TABLET ORAL
Qty: 30 | Refills: 0
Start: 2024-05-23 | End: 2024-06-21

## 2024-05-23 RX ORDER — VALACYCLOVIR 500 MG/1
1 TABLET, FILM COATED ORAL
Qty: 21 | Refills: 0
Start: 2024-05-23 | End: 2024-05-29

## 2024-05-23 RX ORDER — ATORVASTATIN CALCIUM 80 MG/1
1 TABLET, FILM COATED ORAL
Qty: 15 | Refills: 0
Start: 2024-05-23 | End: 2024-06-06

## 2024-05-23 RX ORDER — CLOPIDOGREL BISULFATE 75 MG/1
1 TABLET, FILM COATED ORAL
Qty: 15 | Refills: 0
Start: 2024-05-23 | End: 2024-06-06

## 2024-05-23 RX ADMIN — ESCITALOPRAM OXALATE 10 MILLIGRAM(S): 10 TABLET, FILM COATED ORAL at 11:08

## 2024-05-23 RX ADMIN — CLOPIDOGREL BISULFATE 75 MILLIGRAM(S): 75 TABLET, FILM COATED ORAL at 11:27

## 2024-05-23 RX ADMIN — Medication 75 MICROGRAM(S): at 06:11

## 2024-05-23 NOTE — CARE COORDINATION ASSESSMENT. - NSPASTMEDSURGHISTORY_GEN_ALL_CORE_FT
PAST MEDICAL & SURGICAL HISTORY:  Hallux rigidus of left foot      Insomnia      Dyslipidemia      Obstructive sleep apnea      Hypothyroidism      Hx of foot surgery  2011      Fracture of wrist  right 2009      S/P spinal fusion  cervical  2005      History of hysterectomy  2003      Hyperlipidemia      TIA (transient ischemic attack)      History of cerebral aneurysm      H/O Bell's palsy

## 2024-05-23 NOTE — DISCHARGE NOTE PROVIDER - HOSPITAL COURSE
HPI:  67yo F with PMHx TIA, HLD, HARRIS (not on CPAP), hypothyroidism, cerebral aneurysm (3mm) and Bell's palsy presenting with left sided facial droop. Patient states she noted left sided facial droop since 8pm last night. Her prior TIAs she exhibited symptoms of dysarthria, mental fog and nausea, but has none of these symptoms currently. She states her hx of Bell's palsy is right sided. Aside from the facial droop she denies any other symptoms    IN THE ED  VS: T 98.5, HR 86, /62, RR 18, SpO2 98% on RA  Labs: WNL  Imaging: CTA head and neck with no acute pathology  EKG NSR  S/p Plavix 75mg, Lovenox 40mg (22 May 2024 15:23)      ---  HOSPITAL COURSE: Patient admitted to medicine floor for management of     Pt seen and examined on day of discharge. Patient is medically optimized for discharge to home with close outpatient followup.    ---  CONSULTANTS:     ---  TIME SPENT:  I, the attending physician, was physically present for the key portions of the evaluation and management (E/M) service provided. The total amount of time spent reviewing the hospital notes, laboratory values, imaging findings, assessing/counseling the patient, discussing with consultant physicians, social work, nursing staff was -- minutes    ---  Primary care provider was made aware of plan for discharge:      [  ] NO     [  ] YES   HPI:  69yo F with PMHx TIA, HLD, HARRIS (not on CPAP), hypothyroidism, cerebral aneurysm (3mm) and Bell's palsy presenting with left sided facial droop. Patient states she noted left sided facial droop since 8pm last night. Her prior TIAs she exhibited symptoms of dysarthria, mental fog and nausea, but has none of these symptoms currently. She states her hx of Bell's palsy is right sided. Aside from the facial droop she denies any other symptoms    IN THE ED  VS: T 98.5, HR 86, /62, RR 18, SpO2 98% on RA  Labs: WNL  Imaging: CTA head and neck with no acute pathology  EKG NSR  S/p Plavix 75mg, Lovenox 40mg (22 May 2024 15:23)        ---  HOSPITAL COURSE: Patient admitted to medicine floor for rule out CVA. MRI is negative. She is seen by Neuro and recommended Plavix and statin. She will follow up with neuro as out patient     Pt seen and examined on day of discharge. Patient is medically optimized for discharge to home with close outpatient followup.    ---  CONSULTANTS:     ---  TIME SPENT:  I, the attending physician, was physically present for the key portions of the evaluation and management (E/M) service provided. The total amount of time spent reviewing the hospital notes, laboratory values, imaging findings, assessing/counseling the patient, discussing with consultant physicians, social work, nursing staff was -- minutes    ---  Primary care provider was made aware of plan for discharge:      [  ] NO     [  ] YES

## 2024-05-23 NOTE — PROGRESS NOTE ADULT - SUBJECTIVE AND OBJECTIVE BOX
neuro cons dict  seen for facial droop on left  d/d cva vs Bell's palsy  plavix  statin  echo  brain mri.
Neurology Follow up note    LOUIS [id number]    HPI:  69yo F with PMHx TIA, HLD, HARRIS (not on CPAP), hypothyroidism, cerebral aneurysm (3mm) and Bell's palsy presenting with left sided facial droop. Patient states she noted left sided facial droop since 8pm last night. Her prior TIAs she exhibited symptoms of dysarthria, mental fog and nausea, but has none of these symptoms currently. She states her hx of Bell's palsy is right sided. Aside from the facial droop she denies any other symptoms    IN THE ED  VS: T 98.5, HR 86, /62, RR 18, SpO2 98% on RA  Labs: WNL  Imaging: CTA head and neck with no acute pathology  EKG NSR  S/p Plavix 75mg, Lovenox 40mg (22 May 2024 15:23)      Interval History -no new events    Patient is seen, chart was reviewed and case was discussed with the treatment team.  Pt is not in any distress.   Lying on bed comfortably.       Vital Signs Last 24 Hrs  T(C): 36.6 (23 May 2024 11:13), Max: 36.8 (23 May 2024 04:49)  T(F): 97.8 (23 May 2024 11:13), Max: 98.2 (23 May 2024 04:49)  HR: 73 (23 May 2024 11:13) (68 - 76)  BP: 113/74 (23 May 2024 11:13) (107/68 - 135/75)  BP(mean): --  RR: 17 (23 May 2024 11:13) (17 - 18)  SpO2: 98% (23 May 2024 11:13) (93% - 98%)    Parameters below as of 23 May 2024 11:13  Patient On (Oxygen Delivery Method): room air            REVIEW OF SYSTEMS:    Constitutional: No fever, weight loss or fatigue  Eyes: No eye pain, visual disturbances, or discharge  ENT:  No difficulty hearing, tinnitus, vertigo; No sinus or throat pain  Neck: No pain or stiffness  Respiratory: No cough, wheezing, chills or hemoptysis  Cardiovascular: No chest pain, palpitations, shortness of breath, dizziness or leg swelling  Gastrointestinal: No abdominal or epigastric pain. No nausea, vomiting or hematemesis;   Genitourinary: No dysuria, frequency, hematuria or incontinenc  Neurological: No headaches, memory loss, loss of strength, numbness or tremors  Psychiatric: No depression, anxiety, mood swings or difficulty sleeping  Musculoskeletal: No joint pain or swelling; No muscle, back or extremity pain  Skin: No itching, burning, rashes or lesions   Lymph Nodes: No enlarged glands  Endocrine: No heat or cold intolerance; No hair loss,  Allergy and Immunologic: No hives or eczema    On Neurological Examination:    Mental Status - Pt is alert, awake, oriented X3. Higher functions are intact.  Follows commands well and able to answer questions appropriately.Mood and affect  normal    Speech -  Normal.    Cranial Nerves - Pupils 3 mm equal and reactive to light, extraocular eye movements intact. Pt has no visual field deficit.  Pt has mild  left facial asymmetry. Facial sensation is intact.Tongue - is in midline.    Muscle tone - is normal all over. Moves all extremities equally. No asymmetry is seen.      Motor Exam - 5/5 all over, No drift. No shaking or tremors.    Sensory Exam - Pin prick, temperature, joint position and vibration are intact on either side. Pt withdraws all extremities equally on stimulation. No asymmetry seen. No complaints of tingling, numbness.    Gait - Able to stand and walk unassisted.      Hemiataxia right/left.    coordination:    Finger to nose: normal    Heel to shin: normal    Deep tendon Reflexes - 2 plus all over.        Romberg - Negative.    Neck Supple -  Yes.     MEDICATIONS    acetaminophen     Tablet .. 650 milliGRAM(s) Oral every 6 hours PRN  atorvastatin 80 milliGRAM(s) Oral at bedtime  clopidogrel Tablet 75 milliGRAM(s) Oral daily  enoxaparin Injectable 40 milliGRAM(s) SubCutaneous every 24 hours  escitalopram 10 milliGRAM(s) Oral daily  levothyroxine 75 MICROGram(s) Oral daily      Allergies    No Known Allergies    Intolerances        LABS:  CBC Full  -  ( 23 May 2024 07:31 )  WBC Count : 4.28 K/uL  RBC Count : 4.15 M/uL  Hemoglobin : 13.4 g/dL  Hematocrit : 39.3 %  Platelet Count - Automated : 360 K/uL  Mean Cell Volume : 94.7 fl  Mean Cell Hemoglobin : 32.3 pg  Mean Cell Hemoglobin Concentration : 34.1 gm/dL  Auto Neutrophil # : 2.07 K/uL  Auto Lymphocyte # : 1.52 K/uL  Auto Monocyte # : 0.53 K/uL  Auto Eosinophil # : 0.12 K/uL  Auto Basophil # : 0.03 K/uL  Auto Neutrophil % : 48.4 %  Auto Lymphocyte % : 35.5 %  Auto Monocyte % : 12.4 %  Auto Eosinophil % : 2.8 %  Auto Basophil % : 0.7 %    Urinalysis Basic - ( 23 May 2024 07:31 )    Color: x / Appearance: x / SG: x / pH: x  Gluc: 108 mg/dL / Ketone: x  / Bili: x / Urobili: x   Blood: x / Protein: x / Nitrite: x   Leuk Esterase: x / RBC: x / WBC x   Sq Epi: x / Non Sq Epi: x / Bacteria: x      05-23    138  |  106  |  14  ----------------------------<  108<H>  4.0   |  29  |  0.58    Ca    9.2      23 May 2024 07:31    TPro  7.2  /  Alb  3.3  /  TBili  0.5  /  DBili  x   /  AST  21  /  ALT  28  /  AlkPhos  75  05-23    Hemoglobin A1C:   Lipid Panel 05-23 @ 07:31  Total Cholesterol, Serum 192  LDL --  Triglycerides 104    Vitamin B12     RADIOLOGY    ASSESSMENT AND PLAN:      seen for left facial weakness  brain mri- unremarkable  ?Bell's palsy vs Small cva not visualized on MRI    dc home  management dw dr arellano  valtrex 1000 mg tid for 1 wk  prednisone 50 mg qd for 1 wk  continue ap/statin  neuro follow up as out patient

## 2024-05-23 NOTE — DISCHARGE NOTE PROVIDER - NSDCCPCAREPLAN_GEN_ALL_CORE_FT
PRINCIPAL DISCHARGE DIAGNOSIS  Diagnosis: Facial droop  Assessment and Plan of Treatment: You were admitted with stroke like symptoms. It was ruled out with CT of the Head and MRI. You were seen by the neurologist. Your symptoms have improved. Please follow with Dr. Mars on discharge.         SECONDARY DISCHARGE DIAGNOSES  Diagnosis: HLD (hyperlipidemia)  Assessment and Plan of Treatment: Continue Crestor    Diagnosis: HARRIS (obstructive sleep apnea)  Assessment and Plan of Treatment: Continue current medical management    Diagnosis: Anxiety  Assessment and Plan of Treatment: Continue Lexapro     PRINCIPAL DISCHARGE DIAGNOSIS  Diagnosis: Facial droop  Assessment and Plan of Treatment: You were admitted with stroke like symptoms. It was ruled out with CT of the Head and MRI. You were seen by the neurologist. Your symptoms have improved. Please follow with Dr. Mars after  discharge.    recommended to continue taking Plavix and Atorvastatin 80mg daily   F/u with PCP in 3-5 days         SECONDARY DISCHARGE DIAGNOSES  Diagnosis: HLD (hyperlipidemia)  Assessment and Plan of Treatment: Continue Crestor    Diagnosis: Anxiety  Assessment and Plan of Treatment: Continue Lexapro    Diagnosis: HARRIS (obstructive sleep apnea)  Assessment and Plan of Treatment: Continue current medical management     PRINCIPAL DISCHARGE DIAGNOSIS  Diagnosis: Facial droop  Assessment and Plan of Treatment: You were admitted with stroke like symptoms. It was ruled out with CT of the Head and MRI. You were seen by the neurologist. Your symptoms have improved. Please follow with Dr. Mars after  discharge.    recommended to continue taking Plavix and Atorvastatin 80mg daily. Valtrex for suspected Bell's palsy due to viral infection possibly   Meds per the list   F/u with PCP in 3-5 days         SECONDARY DISCHARGE DIAGNOSES  Diagnosis: HLD (hyperlipidemia)  Assessment and Plan of Treatment: Continue Crestor    Diagnosis: Anxiety  Assessment and Plan of Treatment: Continue Lexapro    Diagnosis: HARRIS (obstructive sleep apnea)  Assessment and Plan of Treatment: Continue current medical management

## 2024-05-23 NOTE — CARE COORDINATION ASSESSMENT. - NSCAREPROVIDERS_GEN_ALL_CORE_FT
CARE PROVIDERS:  Accepting Physician: Mauri Davison  Administration: Len Wells  Administration: Tristian Walden  Admitting: Mauri Davison  Attending: Mauri Davison  Case Management: Bambi Espinal  Consultant: Salas Beltrán  Covering Team: Salas Beltrán  ED Attending: Abdulaziz Perez  ED Nurse: Deondre Caceres  Nurse: Ara Adam  Nurse: Tisha Beaulieu  Override: Rae Vinson  Override: Jodee Floyd  Override: Joan Kramer  Override: Enrrique Brian  Override: Ngoc Lisa  PCA/Nursing Assistant: Rafaela Marquez  Primary Team: Mena Melvin  Primary Team: Georgette Metcalf  Primary Team: Whitney Ohara  Registered Dietitian: Pau Cuenca  : Nancy Jackson  : Gwen Torres

## 2024-05-23 NOTE — CARE COORDINATION ASSESSMENT. - NSDCPLANSERVICES_GEN_ALL_CORE
This CM met with the pt at the bedside. Introduced myself as the CM explained my role and left contact information. The pt verbalized understanding. The pt lives in a private home with her  and is independent with ADLs and ambulates without any devices. No services or HHA in the home. The pt is for home with no skilled needs. Pt was seen by PT/OT and has no skilled needs. The PCP is Dr. Benedicto Kelly and the pharmacy is [facility] in [place]. The pt states that she will transport herself home. CM to remain available for post acute needs./No Anticipated Discharge Needs

## 2024-05-23 NOTE — DISCHARGE NOTE NURSING/CASE MANAGEMENT/SOCIAL WORK - NSDCPEPTSTROKESIGNS_GEN_ALL_CORE
If you are a smoker, it is important for your health to stop smoking. Please be aware that second hand smoke is also harmful.
Sudden numbness or weakness of the face, arm, or leg, especially on one side of the body. Confusion, trouble speaking or understanding. Trouble seeing in one or both eyes. Trouble walking, dizziness, loss of balance or coordination. Severe headache.

## 2024-05-23 NOTE — PHARMACOTHERAPY INTERVENTION NOTE - COMMENTS
Admission counseling - discussed current and new medications with the patient at bedside including indications, directions, and adverse effects to monitor. Patient verbalized understanding and had no further questions.

## 2024-05-23 NOTE — DISCHARGE NOTE PROVIDER - CARE PROVIDER_API CALL
Salas Beltrán  Neurology  [address] [place] [address]  Phone: [phone]  Fax: [phone]  Follow Up Time: 1 week

## 2024-05-23 NOTE — DISCHARGE NOTE NURSING/CASE MANAGEMENT/SOCIAL WORK - PATIENT PORTAL LINK FT
You can access the FollowMyHealth Patient Portal offered by [facility] by registering at the following website: [email or web address]. By joining [facility]’s FollowMyHealth portal, you will also be able to view your health information using other applications (apps) compatible with our system.

## 2024-05-23 NOTE — DISCHARGE NOTE PROVIDER - NSDCMRMEDTOKEN_GEN_ALL_CORE_FT
Crestor 20 mg oral tablet: 1 tab(s) orally once a day (at bedtime)  escitalopram 10 mg oral tablet: 1 tab(s) orally once a day  Synthroid 75 mcg (0.075 mg) oral tablet: 1 tab(s) orally once a day   atorvastatin 80 mg oral tablet: 1 tab(s) orally once a day (at bedtime)  clopidogrel 75 mg oral tablet: 1 tab(s) orally once a day  escitalopram 10 mg oral tablet: 1 tab(s) orally once a day  Synthroid 75 mcg (0.075 mg) oral tablet: 1 tab(s) orally once a day   clopidogrel 75 mg oral tablet: 1 tab(s) orally once a day  Crestor 40 mg oral tablet: 1 tab(s) orally once a day (at bedtime)  escitalopram 10 mg oral tablet: 1 tab(s) orally once a day  Synthroid 75 mcg (0.075 mg) oral tablet: 1 tab(s) orally once a day  Valtrex 1 g oral tablet: 1 tab(s) orally 3 times a day   clopidogrel 75 mg oral tablet: 1 tab(s) orally once a day  Crestor 40 mg oral tablet: 1 tab(s) orally once a day (at bedtime)  escitalopram 10 mg oral tablet: 1 tab(s) orally once a day  predniSONE 10 mg oral tablet: 5 tab(s) orally once a day  Synthroid 75 mcg (0.075 mg) oral tablet: 1 tab(s) orally once a day  Valtrex 1 g oral tablet: 1 tab(s) orally 3 times a day

## 2025-05-21 NOTE — ED ADULT NURSE NOTE - CAS TRG GEN SKIN CONDITION
{Tip! Place Orders  Reconcile Immunizations  Add External Results  Tip sections disappear when note is signed so you do not need to manually delete.  :3}   Health Maintenance     Meningococcal Serogroup B Vaccine (1 of 2 - Standard)  Never done    COVID-19 Vaccine (1 - 2024-25 season)  Never done         Following review of the above:  Patient is not proceeding with: COVID-19 and Meningococcal    Note: Refer to final orders and clinician documentation.    {Tip! Click here to provide feedback:3}     Warm

## 2025-07-21 ENCOUNTER — NON-APPOINTMENT (OUTPATIENT)
Age: 70
End: 2025-07-21